# Patient Record
Sex: MALE | Race: OTHER | Employment: FULL TIME | URBAN - METROPOLITAN AREA
[De-identification: names, ages, dates, MRNs, and addresses within clinical notes are randomized per-mention and may not be internally consistent; named-entity substitution may affect disease eponyms.]

---

## 2017-01-23 ENCOUNTER — APPOINTMENT (OUTPATIENT)
Dept: LAB | Age: 57
End: 2017-01-23
Attending: UROLOGY
Payer: COMMERCIAL

## 2017-01-23 DIAGNOSIS — Z01.812 PRE-PROCEDURE LAB EXAM: ICD-10-CM

## 2017-01-23 PROCEDURE — 87086 URINE CULTURE/COLONY COUNT: CPT

## 2017-01-26 PROCEDURE — 88305 TISSUE EXAM BY PATHOLOGIST: CPT | Performed by: UROLOGY

## 2017-02-07 PROBLEM — C61 CANCER OF PROSTATE W/MED RECUR RISK (T2B-C OR GLEASON 7 OR PSA 10-20) (HCC): Status: ACTIVE | Noted: 2017-02-07

## 2017-02-13 PROBLEM — J44.9 CHRONIC OBSTRUCTIVE PULMONARY DISEASE (HCC): Status: ACTIVE | Noted: 2017-02-13

## 2017-02-13 NOTE — PROGRESS NOTES
Chief Complaint:   Patient presents with:  Pre-Op Exam    HPI:   This is a 60year old male     ANXIETY   Not taking medications  More anxious, because of ongoing illness  Not sleeping well  Denies worthlessness or hopelessness, has been using wifes klonop into the lungs 2 (two) times daily. Disp: 3 Package Rfl: 3     No current facility-administered medications on file prior to visit.    Ready to quit: Not Answered  Counseling given: Not Answered      REVIEW OF SYSTEMS:   Review of systems significant for an (CPT=93017); Future    Essential hypertension  Will start on lisinopril    -     lisinopril 10 MG Oral Tab; Take 1 tablet (10 mg total) by mouth daily. No Follow-up on file.

## 2017-02-13 NOTE — PATIENT INSTRUCTIONS
Thank you for choosing [facility]  To Do:  FOR JHOAN SHEREEN [address] [facility]  1. Retreive old records from cardiology  2. Con tinue Klonopin  3. Follow up in 1 month for pre op exam  4. Start Flovent for COPD  5.  Start Lisinopril for blood pressure, Daily BP saleem a therapist by yourself or in a group. Therapy can also help you work through problems in your life, such as drug or alcohol dependence, that may be making your anxiety worse.   Getting better takes time  Therapy will help you feel better and teach you skil more. Quitting isn’t easy, but millions of people have done it. You can, too. It’s never too late to quit.   Getting started  Boost your chances of success by deciding on your “quit plan.” Your health care provider and cardiac rehab team can help you develo stressful. · If you smoke, now’s a great time to quit. Even if you don’t quit, never smoke around your loved one. Secondhand smoke is dangerous to his or her heart. · The best goals are accomplished in teams.  Remember that when your loved one states he o reasons you want to quit. Keep this list and read it often. · Pick a date to quit smoking. Then stick to it. · List the things that make you want to smoke. Think of ways to avoid these triggers.   · Set goals for yourself, such as going for a week without

## 2017-02-15 PROBLEM — I10 ESSENTIAL HYPERTENSION: Status: ACTIVE | Noted: 2017-02-15

## 2017-02-15 PROBLEM — E55.9 VITAMIN D DEFICIENCY: Status: ACTIVE | Noted: 2017-02-15

## 2017-02-16 ENCOUNTER — TELEPHONE (OUTPATIENT)
Dept: FAMILY MEDICINE CLINIC | Facility: CLINIC | Age: 57
End: 2017-02-16

## 2017-05-22 RX ORDER — LISINOPRIL 10 MG/1
TABLET ORAL
Qty: 90 TABLET | Refills: 0 | Status: SHIPPED | OUTPATIENT
Start: 2017-05-22 | End: 2017-08-21

## 2017-08-22 RX ORDER — LISINOPRIL 10 MG/1
TABLET ORAL
Qty: 30 TABLET | Refills: 0 | Status: SHIPPED | OUTPATIENT
Start: 2017-08-22 | End: 2017-10-03

## 2017-10-03 RX ORDER — LISINOPRIL 10 MG/1
TABLET ORAL
Qty: 30 TABLET | Refills: 0 | Status: SHIPPED | OUTPATIENT
Start: 2017-10-03 | End: 2021-04-12

## 2017-10-03 NOTE — TELEPHONE ENCOUNTER
LOV : 02/13/17 LF:08/22/17    Pending Prescriptions Disp Refills    LISINOPRIL 10 MG Oral Tab [Pharmacy Med Name: LISINOPRIL 10MG  TAB] 30 tablet 0     Sig: TAKE ONE TABLET BY MOUTH ONCE DAILY       Appointment in past 6 or next 3 months    Please approve

## 2018-11-14 ENCOUNTER — TELEPHONE (OUTPATIENT)
Dept: FAMILY MEDICINE CLINIC | Facility: CLINIC | Age: 58
End: 2018-11-14

## 2018-11-14 NOTE — TELEPHONE ENCOUNTER
Left message for patient letting him know that he has not been seen since 2017 and is due for a annual physical so we can also check his blood pressure. Last time patient was in office bp was elevated.  If patient calls back he will need a visit with a prov

## 2019-02-13 ENCOUNTER — PATIENT OUTREACH (OUTPATIENT)
Dept: FAMILY MEDICINE CLINIC | Facility: CLINIC | Age: 59
End: 2019-02-13

## 2021-04-12 ENCOUNTER — APPOINTMENT (OUTPATIENT)
Dept: CT IMAGING | Facility: HOSPITAL | Age: 61
End: 2021-04-12
Attending: EMERGENCY MEDICINE
Payer: COMMERCIAL

## 2021-04-12 ENCOUNTER — APPOINTMENT (OUTPATIENT)
Dept: GENERAL RADIOLOGY | Facility: HOSPITAL | Age: 61
End: 2021-04-12
Payer: COMMERCIAL

## 2021-04-12 ENCOUNTER — HOSPITAL ENCOUNTER (OUTPATIENT)
Facility: HOSPITAL | Age: 61
Setting detail: OBSERVATION
Discharge: HOME | End: 2021-04-13
Attending: INTERNAL MEDICINE | Admitting: INTERNAL MEDICINE
Payer: COMMERCIAL

## 2021-04-12 DIAGNOSIS — R07.9 CHEST PAIN OF UNCERTAIN ETIOLOGY: Primary | ICD-10-CM

## 2021-04-12 DIAGNOSIS — R20.2 PARESTHESIAS: ICD-10-CM

## 2021-04-12 PROCEDURE — 70450 CT HEAD/BRAIN W/O DYE: CPT | Performed by: EMERGENCY MEDICINE

## 2021-04-12 PROCEDURE — 71045 X-RAY EXAM CHEST 1 VIEW: CPT

## 2021-04-12 PROCEDURE — 99220 INITIAL OBSERVATION CARE,LEVL III: CPT | Performed by: HOSPITALIST

## 2021-04-12 RX ORDER — POLYETHYLENE GLYCOL 3350 17 G/17G
17 POWDER, FOR SOLUTION ORAL DAILY PRN
Status: DISCONTINUED | OUTPATIENT
Start: 2021-04-12 | End: 2021-04-13

## 2021-04-12 RX ORDER — CLONAZEPAM 0.5 MG/1
0.5 TABLET ORAL 2 TIMES DAILY PRN
Status: DISCONTINUED | OUTPATIENT
Start: 2021-04-12 | End: 2021-04-13

## 2021-04-12 RX ORDER — SODIUM PHOSPHATE, DIBASIC AND SODIUM PHOSPHATE, MONOBASIC 7; 19 G/133ML; G/133ML
1 ENEMA RECTAL ONCE AS NEEDED
Status: DISCONTINUED | OUTPATIENT
Start: 2021-04-12 | End: 2021-04-13

## 2021-04-12 RX ORDER — BISACODYL 10 MG
10 SUPPOSITORY, RECTAL RECTAL
Status: DISCONTINUED | OUTPATIENT
Start: 2021-04-12 | End: 2021-04-13

## 2021-04-12 RX ORDER — ASPIRIN 325 MG
325 TABLET ORAL DAILY
Status: DISCONTINUED | OUTPATIENT
Start: 2021-04-13 | End: 2021-04-13

## 2021-04-12 RX ORDER — NITROGLYCERIN 0.4 MG/1
0.4 TABLET SUBLINGUAL EVERY 5 MIN PRN
Status: DISCONTINUED | OUTPATIENT
Start: 2021-04-12 | End: 2021-04-13

## 2021-04-12 RX ORDER — CLONAZEPAM 0.5 MG/1
0.5 TABLET ORAL 3 TIMES DAILY PRN
COMMUNITY

## 2021-04-12 RX ORDER — ACETAMINOPHEN 325 MG/1
650 TABLET ORAL EVERY 6 HOURS PRN
Status: DISCONTINUED | OUTPATIENT
Start: 2021-04-12 | End: 2021-04-13

## 2021-04-12 RX ORDER — ONDANSETRON 2 MG/ML
4 INJECTION INTRAMUSCULAR; INTRAVENOUS EVERY 6 HOURS PRN
Status: DISCONTINUED | OUTPATIENT
Start: 2021-04-12 | End: 2021-04-13

## 2021-04-12 RX ORDER — ENOXAPARIN SODIUM 100 MG/ML
40 INJECTION SUBCUTANEOUS NIGHTLY
Status: DISCONTINUED | OUTPATIENT
Start: 2021-04-12 | End: 2021-04-13

## 2021-04-12 RX ORDER — ASPIRIN 325 MG
325 TABLET ORAL DAILY
Status: DISCONTINUED | OUTPATIENT
Start: 2021-04-12 | End: 2021-04-12

## 2021-04-12 NOTE — ED INITIAL ASSESSMENT (HPI)
Patient states for the last 2 months he has been having this right sided chest discomfort. It has worsened and wakes him up at night and aches throughout the day. Patient states \"It almost feels like a pulled muscle but I know its not. \" Patient states he

## 2021-04-12 NOTE — ED PROVIDER NOTES
Patient Seen in: [facility] Emergency Department      History   Patient presents with:  Chest Pain Angina    Stated Complaint: chest pain    HPI/Subjective:   HPI    This is a 41-year-old gentleman, history of previous hypertension no longer under t ischemic attack) 10/2011              Past Surgical History:   Procedure Laterality Date   • OTHER SURGICAL HISTORY      cyst removed from back 3/2016   • OTHER SURGICAL HISTORY  1/27/17    prostate biopsy                Social History    Tobacco Use limits   CBC W/ DIFFERENTIAL - Abnormal; Notable for the following components:    WBC 12.5 (*)     RDW-SD 46.8 (*)     Neutrophil Absolute Prelim 9.26 (*)     Neutrophil Absolute 9.26 (*)     Monocyte Absolute 1.19 (*)     All other components within stephie EKG has no acute ischemic changes his troponin is negative his D-dimer is negative both of which are reassuring given the duration of his symptoms.   Patient reports no acute change in the paresthesias in his left hand and left foot, we will obtain a CT of

## 2021-04-12 NOTE — H&P
[facility]PITAL[facility]  History and Physical     [facility] Patient Status:  Emergency    1960 MRN [id number]   Location [address] Attending Gertrude Hutchison MD   Hosp Day # 0 PCP No primary care provider on file.      Chief Hypertension Father    • Lipids Father    • Cancer Father         prostate cancer   • Other (Other[other]) Mother         TB   • Hypertension Mother      Allergies: No Known Allergies  Medications:  No current facility-administered medications on file prio <0.045     Imaging: Imaging data reviewed in Epic. ASSESSMENT / PLAN:   1. Chest Pain- atypical  1. Trop neg x2   2. ECHO  3. ASA  4. [facility] Cardio consult  2. Transient left sided tingling- exam unimpressive at this time- CT brain in ED unremarkable  3.  Anx

## 2021-04-13 ENCOUNTER — APPOINTMENT (OUTPATIENT)
Dept: CV DIAGNOSTICS | Facility: HOSPITAL | Age: 61
End: 2021-04-13
Attending: HOSPITALIST
Payer: COMMERCIAL

## 2021-04-13 ENCOUNTER — APPOINTMENT (OUTPATIENT)
Dept: CV DIAGNOSTICS | Facility: HOSPITAL | Age: 61
End: 2021-04-13
Attending: INTERNAL MEDICINE
Payer: COMMERCIAL

## 2021-04-13 VITALS
RESPIRATION RATE: 18 BRPM | TEMPERATURE: 98 F | HEART RATE: 67 BPM | HEIGHT: 72 IN | DIASTOLIC BLOOD PRESSURE: 84 MMHG | BODY MASS INDEX: 24.92 KG/M2 | SYSTOLIC BLOOD PRESSURE: 133 MMHG | OXYGEN SATURATION: 97 % | WEIGHT: 184 LBS

## 2021-04-13 PROCEDURE — 93306 TTE W/DOPPLER COMPLETE: CPT | Performed by: HOSPITALIST

## 2021-04-13 PROCEDURE — 99217 OBSERVATION CARE DISCHARGE: CPT | Performed by: INTERNAL MEDICINE

## 2021-04-13 PROCEDURE — 78452 HT MUSCLE IMAGE SPECT MULT: CPT | Performed by: INTERNAL MEDICINE

## 2021-04-13 PROCEDURE — 93017 CV STRESS TEST TRACING ONLY: CPT | Performed by: INTERNAL MEDICINE

## 2021-04-13 PROCEDURE — 93018 CV STRESS TEST I&R ONLY: CPT | Performed by: INTERNAL MEDICINE

## 2021-04-13 RX ORDER — PANTOPRAZOLE SODIUM 40 MG/1
40 TABLET, DELAYED RELEASE ORAL
Status: DISCONTINUED | OUTPATIENT
Start: 2021-04-13 | End: 2021-04-13

## 2021-04-13 RX ORDER — PANTOPRAZOLE SODIUM 40 MG/1
40 TABLET, DELAYED RELEASE ORAL
Qty: 14 TABLET | Refills: 0 | Status: SHIPPED | OUTPATIENT
Start: 2021-04-14 | End: 2021-04-28

## 2021-04-13 NOTE — PLAN OF CARE
NURSING ADMISSION NOTE      Patient admitted via Cart  Oriented to room. Safety precautions initiated. Bed in low position. Call light in reach.        04/12/21 1930 04/12/21 1932 04/12/21 1934   Vital Signs   /80 124/81 145/77   MAP (mmHg) 89

## 2021-04-13 NOTE — PROGRESS NOTES
04/13/21 1102   Clinical Encounter Type   Visited With Patient   Patient's Supportive Strategies/Resources Confucianist/Elisha Haas Oriental orthodox in [place]. Referral To Nurse  ( provided Power of  for [employer].  Patient would like some hugo

## 2021-04-13 NOTE — PLAN OF CARE
Received pt at 0730. A&o x4. NSR on tele. VSS. Denies SOB. WNL on room air. Up ad graeme. Last BM 4/12 with good urine output. Pt denies chest pain.      POC: Echo, lexiscan    Problem: Patient/Family Goals  Goal: Patient/Family Long Term Goal  Description: Pa

## 2021-04-13 NOTE — PROGRESS NOTES
CARDIODIAGNOSTIC PRELIMINARY REPORT:     Raissa Emelyn completed     Tolerated well    Second set of images pending

## 2021-04-13 NOTE — PROGRESS NOTES
NURSING DISCHARGE NOTE    Discharged Home via Wheelchair. Accompanied by Spouse  Belongings Taken by patient/family. Tele dc'd. IV removed. Catheter intact. Discharge instructions completed. Pt verbalized understanding.

## 2021-04-13 NOTE — PLAN OF CARE
Preliminary nuclear stress test results as called to me by Dr. Dre Wood:    LVEF 53% and negative for perfusion abnormalities. Stable CV status for DC. Recommend PPI at DC.      Juanchaim Hernandez, RACHEL   [facility] Cardiology

## 2021-04-13 NOTE — DISCHARGE SUMMARY
[facility]  Discharge Summary    Manju Benjamin Patient Status:  Observation    1960 MRN [id number]   [facility] 2NE-A Attending Phill Mcleod MD   Hosp Day # 0 PCP No primary care provider on file.      Date of Admission:  size was normal. Wall thickness was normal.      Systolic function was normal. The estimated ejection fraction was 60-65%.      There was no diagnostic evidence for regional wall motion abnormalities.      Left ventricular diastolic function parameters wer Medications:        Discharge Medications      START taking these medications      Instructions Prescription details   Pantoprazole Sodium 40 MG Tbec  Commonly known as: PROTONIX  Start taking on: April 14, 2021      Take 1 tablet (40 mg total) by mouth ev mucosa moist  Neck: no adenopathy, no carotid bruit, no JVD  Lungs: clear to auscultation bilaterally  Heart: S1, S2 normal, no murmur,  regular rate and rhythm  Abdomen: soft, non-tender; bowel sounds normal  Extremities: extremities normal,no cyanosis or

## 2021-04-13 NOTE — CONSULTS
[facility] Cardiology Consultation    Felecia Ashleyri Patient Status:  Observation    1960 MRN [id number]   [facility] 2NE-A Attending Larry Ferrara MD   Hosp Day # 0 PCP No primary care provider on file.      Reason for Consultation:  Chest pa reports that he does not use drugs. Review of Systems:  All systems were reviewed and are negative except as described above in HPI.     Physical Exam:      Temp:  [97.6 °F (36.4 °C)-98 °F (36.7 °C)] 97.6 °F (36.4 °C)  Pulse:  [59-74] 67  Resp:  [13-20]

## 2024-01-22 RX ORDER — BUDESONIDE AND FORMOTEROL FUMARATE DIHYDRATE 160; 4.5 UG/1; UG/1
2 AEROSOL RESPIRATORY (INHALATION) 2 TIMES DAILY
COMMUNITY

## 2024-01-22 RX ORDER — PRAZOSIN HYDROCHLORIDE 1 MG/1
1 CAPSULE ORAL DAILY
COMMUNITY

## 2024-01-22 RX ORDER — TIOTROPIUM BROMIDE INHALATION SPRAY 3.12 UG/1
1 SPRAY, METERED RESPIRATORY (INHALATION) AS NEEDED
COMMUNITY

## 2024-01-22 RX ORDER — AMLODIPINE BESYLATE 5 MG/1
5 TABLET ORAL DAILY
COMMUNITY

## 2024-01-22 RX ORDER — ACETAMINOPHEN 325 MG/1
650 TABLET ORAL EVERY 6 HOURS PRN
COMMUNITY

## 2024-01-23 ENCOUNTER — LAB ENCOUNTER (OUTPATIENT)
Dept: LAB | Age: 64
End: 2024-01-23
Attending: STUDENT IN AN ORGANIZED HEALTH CARE EDUCATION/TRAINING PROGRAM
Payer: COMMERCIAL

## 2024-01-23 DIAGNOSIS — Z01.818 PREOP TESTING: ICD-10-CM

## 2024-01-23 PROCEDURE — 87641 MR-STAPH DNA AMP PROBE: CPT

## 2024-01-24 LAB — MRSA DNA SPEC QL NAA+PROBE: NEGATIVE

## 2024-01-25 ENCOUNTER — ANESTHESIA EVENT (OUTPATIENT)
Dept: SURGERY | Facility: HOSPITAL | Age: 64
End: 2024-01-25
Payer: COMMERCIAL

## 2024-01-26 ENCOUNTER — HOSPITAL ENCOUNTER (OUTPATIENT)
Facility: HOSPITAL | Age: 64
Discharge: HOME | End: 2024-01-27
Attending: STUDENT IN AN ORGANIZED HEALTH CARE EDUCATION/TRAINING PROGRAM | Admitting: STUDENT IN AN ORGANIZED HEALTH CARE EDUCATION/TRAINING PROGRAM
Payer: COMMERCIAL

## 2024-01-26 ENCOUNTER — ANESTHESIA (OUTPATIENT)
Dept: SURGERY | Facility: HOSPITAL | Age: 64
End: 2024-01-26
Payer: COMMERCIAL

## 2024-01-26 ENCOUNTER — APPOINTMENT (OUTPATIENT)
Dept: GENERAL RADIOLOGY | Facility: HOSPITAL | Age: 64
End: 2024-01-26
Attending: STUDENT IN AN ORGANIZED HEALTH CARE EDUCATION/TRAINING PROGRAM
Payer: COMMERCIAL

## 2024-01-26 DIAGNOSIS — Z01.818 PREOP TESTING: Primary | ICD-10-CM

## 2024-01-26 PROBLEM — M48.061 LUMBAR SPINAL STENOSIS: Status: ACTIVE | Noted: 2024-01-26

## 2024-01-26 PROBLEM — J44.9 COPD (CHRONIC OBSTRUCTIVE PULMONARY DISEASE) (HCC): Status: ACTIVE | Noted: 2017-02-13

## 2024-01-26 PROCEDURE — 76000 FLUOROSCOPY <1 HR PHYS/QHP: CPT | Performed by: STUDENT IN AN ORGANIZED HEALTH CARE EDUCATION/TRAINING PROGRAM

## 2024-01-26 PROCEDURE — 01NB0ZZ RELEASE LUMBAR NERVE, OPEN APPROACH: ICD-10-PCS | Performed by: STUDENT IN AN ORGANIZED HEALTH CARE EDUCATION/TRAINING PROGRAM

## 2024-01-26 PROCEDURE — 99214 OFFICE O/P EST MOD 30 MIN: CPT | Performed by: HOSPITALIST

## 2024-01-26 PROCEDURE — 0SG3071 FUSION OF LUMBOSACRAL JOINT WITH AUTOLOGOUS TISSUE SUBSTITUTE, POSTERIOR APPROACH, POSTERIOR COLUMN, OPEN APPROACH: ICD-10-PCS | Performed by: STUDENT IN AN ORGANIZED HEALTH CARE EDUCATION/TRAINING PROGRAM

## 2024-01-26 PROCEDURE — 0SG0071 FUSION OF LUMBAR VERTEBRAL JOINT WITH AUTOLOGOUS TISSUE SUBSTITUTE, POSTERIOR APPROACH, POSTERIOR COLUMN, OPEN APPROACH: ICD-10-PCS | Performed by: STUDENT IN AN ORGANIZED HEALTH CARE EDUCATION/TRAINING PROGRAM

## 2024-01-26 RX ORDER — ONDANSETRON 2 MG/ML
INJECTION INTRAMUSCULAR; INTRAVENOUS AS NEEDED
Status: DISCONTINUED | OUTPATIENT
Start: 2024-01-26 | End: 2024-01-26 | Stop reason: SURG

## 2024-01-26 RX ORDER — MORPHINE SULFATE 10 MG/ML
6 INJECTION, SOLUTION INTRAMUSCULAR; INTRAVENOUS EVERY 10 MIN PRN
Status: DISCONTINUED | OUTPATIENT
Start: 2024-01-26 | End: 2024-01-26 | Stop reason: HOSPADM

## 2024-01-26 RX ORDER — DIPHENHYDRAMINE HYDROCHLORIDE 50 MG/ML
25 INJECTION INTRAMUSCULAR; INTRAVENOUS EVERY 4 HOURS PRN
Status: DISCONTINUED | OUTPATIENT
Start: 2024-01-26 | End: 2024-01-27

## 2024-01-26 RX ORDER — BUPIVACAINE HYDROCHLORIDE AND EPINEPHRINE 5; 5 MG/ML; UG/ML
INJECTION, SOLUTION PERINEURAL AS NEEDED
Status: DISCONTINUED | OUTPATIENT
Start: 2024-01-26 | End: 2024-01-26 | Stop reason: HOSPADM

## 2024-01-26 RX ORDER — CEFAZOLIN SODIUM/WATER 2 G/20 ML
2 SYRINGE (ML) INTRAVENOUS ONCE
Status: COMPLETED | OUTPATIENT
Start: 2024-01-26 | End: 2024-01-26

## 2024-01-26 RX ORDER — SODIUM CHLORIDE 9 MG/ML
INJECTION, SOLUTION INTRAVENOUS CONTINUOUS PRN
Status: DISCONTINUED | OUTPATIENT
Start: 2024-01-26 | End: 2024-01-26 | Stop reason: SURG

## 2024-01-26 RX ORDER — VANCOMYCIN HYDROCHLORIDE 1 G/20ML
INJECTION, POWDER, LYOPHILIZED, FOR SOLUTION INTRAVENOUS AS NEEDED
Status: DISCONTINUED | OUTPATIENT
Start: 2024-01-26 | End: 2024-01-26 | Stop reason: HOSPADM

## 2024-01-26 RX ORDER — AMLODIPINE BESYLATE 5 MG/1
5 TABLET ORAL DAILY
Status: DISCONTINUED | OUTPATIENT
Start: 2024-01-27 | End: 2024-01-27

## 2024-01-26 RX ORDER — HYDROMORPHONE HYDROCHLORIDE 1 MG/ML
0.6 INJECTION, SOLUTION INTRAMUSCULAR; INTRAVENOUS; SUBCUTANEOUS EVERY 5 MIN PRN
Status: DISCONTINUED | OUTPATIENT
Start: 2024-01-26 | End: 2024-01-26 | Stop reason: HOSPADM

## 2024-01-26 RX ORDER — ENEMA 19; 7 G/133ML; G/133ML
1 ENEMA RECTAL ONCE AS NEEDED
Status: DISCONTINUED | OUTPATIENT
Start: 2024-01-26 | End: 2024-01-27

## 2024-01-26 RX ORDER — HYDROCODONE BITARTRATE AND ACETAMINOPHEN 10; 325 MG/1; MG/1
1 TABLET ORAL EVERY 6 HOURS PRN
Status: DISCONTINUED | OUTPATIENT
Start: 2024-01-26 | End: 2024-01-27

## 2024-01-26 RX ORDER — HYDROMORPHONE HYDROCHLORIDE 1 MG/ML
0.4 INJECTION, SOLUTION INTRAMUSCULAR; INTRAVENOUS; SUBCUTANEOUS EVERY 5 MIN PRN
Status: DISCONTINUED | OUTPATIENT
Start: 2024-01-26 | End: 2024-01-26 | Stop reason: HOSPADM

## 2024-01-26 RX ORDER — TRANEXAMIC ACID 10 MG/ML
INJECTION, SOLUTION INTRAVENOUS AS NEEDED
Status: DISCONTINUED | OUTPATIENT
Start: 2024-01-26 | End: 2024-01-26 | Stop reason: SURG

## 2024-01-26 RX ORDER — KETAMINE HYDROCHLORIDE 50 MG/ML
INJECTION, SOLUTION INTRAMUSCULAR; INTRAVENOUS AS NEEDED
Status: DISCONTINUED | OUTPATIENT
Start: 2024-01-26 | End: 2024-01-26 | Stop reason: SURG

## 2024-01-26 RX ORDER — METOCLOPRAMIDE HYDROCHLORIDE 5 MG/ML
10 INJECTION INTRAMUSCULAR; INTRAVENOUS EVERY 8 HOURS PRN
Status: DISCONTINUED | OUTPATIENT
Start: 2024-01-26 | End: 2024-01-27

## 2024-01-26 RX ORDER — ACETAMINOPHEN 500 MG
1000 TABLET ORAL ONCE
Status: COMPLETED | OUTPATIENT
Start: 2024-01-26 | End: 2024-01-26

## 2024-01-26 RX ORDER — ALBUTEROL SULFATE 2.5 MG/3ML
2.5 SOLUTION RESPIRATORY (INHALATION) AS NEEDED
Status: DISCONTINUED | OUTPATIENT
Start: 2024-01-26 | End: 2024-01-26 | Stop reason: HOSPADM

## 2024-01-26 RX ORDER — PRAZOSIN HYDROCHLORIDE 1 MG/1
1 CAPSULE ORAL DAILY
Status: DISCONTINUED | OUTPATIENT
Start: 2024-01-27 | End: 2024-01-27

## 2024-01-26 RX ORDER — POLYETHYLENE GLYCOL 3350 17 G/17G
17 POWDER, FOR SOLUTION ORAL DAILY PRN
Status: DISCONTINUED | OUTPATIENT
Start: 2024-01-26 | End: 2024-01-27

## 2024-01-26 RX ORDER — MIDAZOLAM HYDROCHLORIDE 1 MG/ML
INJECTION INTRAMUSCULAR; INTRAVENOUS AS NEEDED
Status: DISCONTINUED | OUTPATIENT
Start: 2024-01-26 | End: 2024-01-26 | Stop reason: SURG

## 2024-01-26 RX ORDER — SENNOSIDES 8.6 MG
17.2 TABLET ORAL NIGHTLY
Status: DISCONTINUED | OUTPATIENT
Start: 2024-01-26 | End: 2024-01-27

## 2024-01-26 RX ORDER — LIDOCAINE HYDROCHLORIDE 10 MG/ML
INJECTION, SOLUTION EPIDURAL; INFILTRATION; INTRACAUDAL; PERINEURAL AS NEEDED
Status: DISCONTINUED | OUTPATIENT
Start: 2024-01-26 | End: 2024-01-26 | Stop reason: SURG

## 2024-01-26 RX ORDER — FLUTICASONE FUROATE AND VILANTEROL 200; 25 UG/1; UG/1
1 POWDER RESPIRATORY (INHALATION) DAILY
Status: DISCONTINUED | OUTPATIENT
Start: 2024-01-26 | End: 2024-01-27

## 2024-01-26 RX ORDER — DEXAMETHASONE SODIUM PHOSPHATE 4 MG/ML
VIAL (ML) INJECTION AS NEEDED
Status: DISCONTINUED | OUTPATIENT
Start: 2024-01-26 | End: 2024-01-26 | Stop reason: SURG

## 2024-01-26 RX ORDER — PHENYLEPHRINE HCL 10 MG/ML
VIAL (ML) INJECTION AS NEEDED
Status: DISCONTINUED | OUTPATIENT
Start: 2024-01-26 | End: 2024-01-26 | Stop reason: SURG

## 2024-01-26 RX ORDER — CEFAZOLIN SODIUM/WATER 2 G/20 ML
2 SYRINGE (ML) INTRAVENOUS EVERY 8 HOURS
Qty: 40 ML | Refills: 0 | Status: COMPLETED | OUTPATIENT
Start: 2024-01-26 | End: 2024-01-27

## 2024-01-26 RX ORDER — MORPHINE SULFATE 4 MG/ML
4 INJECTION, SOLUTION INTRAMUSCULAR; INTRAVENOUS EVERY 10 MIN PRN
Status: DISCONTINUED | OUTPATIENT
Start: 2024-01-26 | End: 2024-01-26 | Stop reason: HOSPADM

## 2024-01-26 RX ORDER — METOCLOPRAMIDE HYDROCHLORIDE 5 MG/ML
10 INJECTION INTRAMUSCULAR; INTRAVENOUS EVERY 8 HOURS PRN
Status: DISCONTINUED | OUTPATIENT
Start: 2024-01-26 | End: 2024-01-26 | Stop reason: HOSPADM

## 2024-01-26 RX ORDER — SODIUM CHLORIDE, SODIUM LACTATE, POTASSIUM CHLORIDE, CALCIUM CHLORIDE 600; 310; 30; 20 MG/100ML; MG/100ML; MG/100ML; MG/100ML
INJECTION, SOLUTION INTRAVENOUS CONTINUOUS
Status: DISCONTINUED | OUTPATIENT
Start: 2024-01-26 | End: 2024-01-27

## 2024-01-26 RX ORDER — HYDROCODONE BITARTRATE AND ACETAMINOPHEN 5; 325 MG/1; MG/1
1 TABLET ORAL EVERY 6 HOURS PRN
Status: DISCONTINUED | OUTPATIENT
Start: 2024-01-26 | End: 2024-01-27

## 2024-01-26 RX ORDER — BISACODYL 10 MG
10 SUPPOSITORY, RECTAL RECTAL
Status: DISCONTINUED | OUTPATIENT
Start: 2024-01-26 | End: 2024-01-27

## 2024-01-26 RX ORDER — MORPHINE SULFATE 4 MG/ML
2 INJECTION, SOLUTION INTRAMUSCULAR; INTRAVENOUS EVERY 10 MIN PRN
Status: DISCONTINUED | OUTPATIENT
Start: 2024-01-26 | End: 2024-01-26 | Stop reason: HOSPADM

## 2024-01-26 RX ORDER — DIAZEPAM 2 MG/1
2 TABLET ORAL EVERY 6 HOURS PRN
Status: DISCONTINUED | OUTPATIENT
Start: 2024-01-26 | End: 2024-01-27

## 2024-01-26 RX ORDER — IPRATROPIUM BROMIDE AND ALBUTEROL SULFATE 2.5; .5 MG/3ML; MG/3ML
3 SOLUTION RESPIRATORY (INHALATION) EVERY 6 HOURS PRN
Status: DISCONTINUED | OUTPATIENT
Start: 2024-01-26 | End: 2024-01-27

## 2024-01-26 RX ORDER — CYCLOBENZAPRINE HCL 10 MG
10 TABLET ORAL EVERY 6 HOURS PRN
Status: DISCONTINUED | OUTPATIENT
Start: 2024-01-26 | End: 2024-01-27

## 2024-01-26 RX ORDER — DOCUSATE SODIUM 100 MG/1
100 CAPSULE, LIQUID FILLED ORAL 2 TIMES DAILY
Status: DISCONTINUED | OUTPATIENT
Start: 2024-01-26 | End: 2024-01-27

## 2024-01-26 RX ORDER — ROCURONIUM BROMIDE 10 MG/ML
INJECTION, SOLUTION INTRAVENOUS AS NEEDED
Status: DISCONTINUED | OUTPATIENT
Start: 2024-01-26 | End: 2024-01-26 | Stop reason: SURG

## 2024-01-26 RX ORDER — SODIUM CHLORIDE, SODIUM LACTATE, POTASSIUM CHLORIDE, CALCIUM CHLORIDE 600; 310; 30; 20 MG/100ML; MG/100ML; MG/100ML; MG/100ML
INJECTION, SOLUTION INTRAVENOUS CONTINUOUS
Status: DISCONTINUED | OUTPATIENT
Start: 2024-01-26 | End: 2024-01-26 | Stop reason: HOSPADM

## 2024-01-26 RX ORDER — LABETALOL HYDROCHLORIDE 5 MG/ML
5 INJECTION, SOLUTION INTRAVENOUS EVERY 5 MIN PRN
Status: DISCONTINUED | OUTPATIENT
Start: 2024-01-26 | End: 2024-01-26 | Stop reason: HOSPADM

## 2024-01-26 RX ORDER — DIPHENHYDRAMINE HCL 25 MG
25 CAPSULE ORAL EVERY 4 HOURS PRN
Status: DISCONTINUED | OUTPATIENT
Start: 2024-01-26 | End: 2024-01-27

## 2024-01-26 RX ORDER — ONDANSETRON 2 MG/ML
4 INJECTION INTRAMUSCULAR; INTRAVENOUS EVERY 6 HOURS PRN
Status: DISCONTINUED | OUTPATIENT
Start: 2024-01-26 | End: 2024-01-27

## 2024-01-26 RX ORDER — LOSARTAN POTASSIUM 25 MG/1
25 TABLET ORAL DAILY
COMMUNITY

## 2024-01-26 RX ORDER — ONDANSETRON 2 MG/ML
4 INJECTION INTRAMUSCULAR; INTRAVENOUS EVERY 6 HOURS PRN
Status: DISCONTINUED | OUTPATIENT
Start: 2024-01-26 | End: 2024-01-26 | Stop reason: HOSPADM

## 2024-01-26 RX ORDER — NALOXONE HYDROCHLORIDE 0.4 MG/ML
0.08 INJECTION, SOLUTION INTRAMUSCULAR; INTRAVENOUS; SUBCUTANEOUS AS NEEDED
Status: DISCONTINUED | OUTPATIENT
Start: 2024-01-26 | End: 2024-01-26 | Stop reason: HOSPADM

## 2024-01-26 RX ORDER — HYDROMORPHONE HYDROCHLORIDE 1 MG/ML
0.2 INJECTION, SOLUTION INTRAMUSCULAR; INTRAVENOUS; SUBCUTANEOUS EVERY 5 MIN PRN
Status: DISCONTINUED | OUTPATIENT
Start: 2024-01-26 | End: 2024-01-26 | Stop reason: HOSPADM

## 2024-01-26 RX ADMIN — ROCURONIUM BROMIDE 20 MG: 10 INJECTION, SOLUTION INTRAVENOUS at 09:27:00

## 2024-01-26 RX ADMIN — PHENYLEPHRINE HCL 150 MCG: 10 MG/ML VIAL (ML) INJECTION at 09:18:00

## 2024-01-26 RX ADMIN — ROCURONIUM BROMIDE 20 MG: 10 INJECTION, SOLUTION INTRAVENOUS at 08:53:00

## 2024-01-26 RX ADMIN — ROCURONIUM BROMIDE 50 MG: 10 INJECTION, SOLUTION INTRAVENOUS at 07:37:00

## 2024-01-26 RX ADMIN — PHENYLEPHRINE HCL 100 MCG: 10 MG/ML VIAL (ML) INJECTION at 08:46:00

## 2024-01-26 RX ADMIN — ROCURONIUM BROMIDE 20 MG: 10 INJECTION, SOLUTION INTRAVENOUS at 08:24:00

## 2024-01-26 RX ADMIN — DEXAMETHASONE SODIUM PHOSPHATE 8 MG: 4 MG/ML VIAL (ML) INJECTION at 07:39:00

## 2024-01-26 RX ADMIN — LIDOCAINE HYDROCHLORIDE 50 MG: 10 INJECTION, SOLUTION EPIDURAL; INFILTRATION; INTRACAUDAL; PERINEURAL at 07:37:00

## 2024-01-26 RX ADMIN — KETAMINE HYDROCHLORIDE 20 MG: 50 INJECTION, SOLUTION INTRAMUSCULAR; INTRAVENOUS at 10:43:00

## 2024-01-26 RX ADMIN — PHENYLEPHRINE HCL 100 MCG: 10 MG/ML VIAL (ML) INJECTION at 08:33:00

## 2024-01-26 RX ADMIN — MIDAZOLAM HYDROCHLORIDE 2 MG: 1 INJECTION INTRAMUSCULAR; INTRAVENOUS at 07:32:00

## 2024-01-26 RX ADMIN — SODIUM CHLORIDE, SODIUM LACTATE, POTASSIUM CHLORIDE, CALCIUM CHLORIDE: 600; 310; 30; 20 INJECTION, SOLUTION INTRAVENOUS at 07:32:00

## 2024-01-26 RX ADMIN — SODIUM CHLORIDE, SODIUM LACTATE, POTASSIUM CHLORIDE, CALCIUM CHLORIDE: 600; 310; 30; 20 INJECTION, SOLUTION INTRAVENOUS at 11:10:00

## 2024-01-26 RX ADMIN — CEFAZOLIN SODIUM/WATER 2 G: 2 G/20 ML SYRINGE (ML) INTRAVENOUS at 07:41:00

## 2024-01-26 RX ADMIN — ROCURONIUM BROMIDE 10 MG: 10 INJECTION, SOLUTION INTRAVENOUS at 08:05:00

## 2024-01-26 RX ADMIN — LIDOCAINE HYDROCHLORIDE 50 MG: 10 INJECTION, SOLUTION EPIDURAL; INFILTRATION; INTRACAUDAL; PERINEURAL at 10:50:00

## 2024-01-26 RX ADMIN — ROCURONIUM BROMIDE 10 MG: 10 INJECTION, SOLUTION INTRAVENOUS at 10:15:00

## 2024-01-26 RX ADMIN — SODIUM CHLORIDE: 9 INJECTION, SOLUTION INTRAVENOUS at 07:42:00

## 2024-01-26 RX ADMIN — SODIUM CHLORIDE: 9 INJECTION, SOLUTION INTRAVENOUS at 10:40:00

## 2024-01-26 RX ADMIN — ONDANSETRON 4 MG: 2 INJECTION INTRAMUSCULAR; INTRAVENOUS at 10:57:00

## 2024-01-26 RX ADMIN — SODIUM CHLORIDE, SODIUM LACTATE, POTASSIUM CHLORIDE, CALCIUM CHLORIDE: 600; 310; 30; 20 INJECTION, SOLUTION INTRAVENOUS at 10:40:00

## 2024-01-26 RX ADMIN — TRANEXAMIC ACID 1000 MG: 10 INJECTION, SOLUTION INTRAVENOUS at 07:57:00

## 2024-01-26 NOTE — ANESTHESIA PREPROCEDURE EVALUATION
Anesthesia PreOp Note    HPI:     Arvin Torres is a 63 year old male who presents for preoperative consultation requested by: Catracho Murry MD    Date of Surgery: 1/26/2024    Procedure(s):  Lumbar 3-S1 laminectomy  Indication: Lumbar decompression    Relevant Problems   No relevant active problems       NPO:  Last Liquid Consumption Date: 01/25/24  Last Liquid Consumption Time: 1800  Last Solid Consumption Date: 01/25/24  Last Solid Consumption Time: 1800  Last Liquid Consumption Date: 01/25/24          History Review:  Patient Active Problem List    Diagnosis Date Noted    Chest pain of uncertain etiology 04/12/2021    Paresthesias 04/12/2021    Cigarette nicotine dependence with nicotine-induced disorder     Essential hypertension 02/15/2017    Vitamin D deficiency 02/15/2017    Chronic obstructive pulmonary disease (HCC) 02/13/2017    Cancer of prostate w/med recur risk (T2b-c or [place] 7 or PSA 10-20) (HCC) 02/07/2017    Smoker 10/04/2016    Anxiety 10/04/2016    Chronic cough 10/04/2016    History of TIAs 10/04/2016       Past Medical History:   Diagnosis Date    Anxiety 10/04/2016    Back problem     Chronic cough 10/04/2016    COPD (chronic obstructive pulmonary disease) (HCC)     Elevated prostate specific antigen (PSA)     High blood pressure     History of TIAs 10/04/2016    approx 2011 developed aphasia. Admitted for 3 days at outside hospital. Work up negative per patient. Symptoms resolved within 2 hours.    Hx of motion sickness     Osteoarthritis     Pneumonia due to organism     Prostate cancer (HCC)     Sleep apnea     cpap    Smoker 10/04/2016    TIA (transient ischemic attack) 10/01/2011    Visual impairment     glasses       Past Surgical History:   Procedure Laterality Date    OTHER SURGICAL HISTORY      cyst removed from back 3/2016    OTHER SURGICAL HISTORY  1/27/17    prostate biopsy       Medications Prior to Admission   Medication Sig Dispense Refill Last Dose    Budesonide-Formoterol  Fumarate 160-4.5 MCG/ACT Inhalation Aerosol Inhale 2 puffs into the lungs 2 (two) times daily.   2024 at 0500    Tiotropium Bromide Monohydrate (SPIRIVA RESPIMAT) 2.5 MCG/ACT Inhalation Aero Soln Inhale 1 puff into the lungs as needed.   2024 at 0500    prazosin 1 MG Oral Cap Take 1 capsule (1 mg total) by mouth daily.   2024 at 0500    amLODIPine 5 MG Oral Tab Take 1 tablet (5 mg total) by mouth daily.   2024 at 0500    acetaminophen 325 MG Oral Tab Take 2 tablets (650 mg total) by mouth every 6 (six) hours as needed for Pain.   2024 at 1500     Current Facility-Administered Medications Ordered in Epic   Medication Dose Route Frequency Provider Last Rate Last Admin    lactated ringers infusion   Intravenous Continuous Catracho Murry MD        ceFAZolin (Ancef) 2 g in 20mL IV syringe premix  2 g Intravenous Once Catracho Murry MD         No current [facility]-ordered outpatient medications on file.       Allergies   Allergen Reactions    Corn PAIN     Bad leg cramps       Family History   Problem Relation Age of Onset    Hypertension Father     Lipids Father     Cancer Father         prostate cancer    Other (Other[other]) Mother         TB    Hypertension Mother      Social History     Socioeconomic History    Marital status:    Tobacco Use    Smoking status: Former     Packs/day: 0.50     Years: 45.00     Additional pack years: 0.00     Total pack years: 22.50     Types: Cigarettes     Quit date: 3/17/2023     Years since quittin.8    Smokeless tobacco: Never   Vaping Use    Vaping Use: Never used   Substance and Sexual Activity    Alcohol use: Yes     Comment: weekly    Drug use: No       Available pre-op labs reviewed.             Vital Signs:  Body mass index is 27.89 kg/m².   height is 1.803 m (5' 11\") and weight is 90.7 kg (200 lb). His oral temperature is 97.7 °F (36.5 °C). His blood pressure is 135/77 and his pulse is 83. His respiration is 16 and oxygen saturation is 94%.    Vitals:    01/22/24 1024 01/26/24 0625   BP:  135/77   Pulse:  83   Resp:  16   Temp:  97.7 °F (36.5 °C)   TempSrc:  Oral   SpO2:  94%   Weight: 88.5 kg (195 lb) 90.7 kg (200 lb)   Height: 1.803 m (5' 11\") 1.803 m (5' 11\")        Anesthesia Evaluation     Patient summary reviewed and Nursing notes reviewed    No history of anesthetic complications   Airway   Mallampati: II  TM distance: >3 FB  Neck ROM: full  Dental - Dentition appears grossly intact     Pulmonary - normal exam   (+) COPD moderate, sleep apnea on CPAP  Cardiovascular - normal exam  Exercise tolerance: good  (+) hypertension well controlled    Neuro/Psych    (+)  TIA, anxiety/panic attacks,        GI/Hepatic/Renal - negative ROS     Comments: Prostate cancer    Endo/Other - negative ROS   Abdominal  - normal exam                 Anesthesia Plan:   ASA:  3  Plan:   General  Airway:  ETT  Post-op Pain Management: IV analgesics and Oral pain medication  Informed Consent Plan and Risks Discussed With:  Patient  Discussed plan with:  CRNA      I have informed Arvin Torres of the nature of the anesthetic plan, benefits, risks including possible dental damage if relevant, major complications, and any alternative forms of anesthetic management.   All of the patient's questions were answered to the best of my ability. The patient desires the anesthetic management as planned.  PITER BENITEZ MD  1/26/2024 6:46 AM  Present on Admission:  **None**

## 2024-01-26 NOTE — INTERVAL H&P NOTE
Pre-op Diagnosis: Lumbar decompression    The above referenced H&P was reviewed by Catracho Murry MD on 1/26/2024, the patient was examined and no significant changes have occurred in the patient's condition since the H&P was performed.  I discussed with the patient and/or legal representative the potential benefits, risks and side effects of this procedure; the likelihood of the patient achieving goals; and potential problems that might occur during recuperation.  I discussed reasonable alternatives to the procedure, including risks, benefits and side effects related to the alternatives and risks related to not receiving this procedure.  We will proceed with procedure as planned.

## 2024-01-26 NOTE — ANESTHESIA POSTPROCEDURE EVALUATION
Patient: Arvin Torres    Procedure Summary       Date: 01/26/24 Room / Location: [facility] MAIN OR 02 / [facility] MAIN OR    Anesthesia Start: 0732 Anesthesia Stop:     Procedure: Lumbar 3-S1 laminectomy, lumbar 4-sacral 1 noninstrumented fusion (Spine Lumbar) Diagnosis: (Lumbar decompression)    Surgeons: Catracho Murry MD Anesthesiologist: Thee Buenrostro MD    Anesthesia Type: general ASA Status: 3            Anesthesia Type: general    Vitals Value Taken Time   /81 01/26/24 1114   Temp 98.6 °F (37 °C) 01/26/24 1114   Pulse 85 01/26/24 1114   Resp 16 01/26/24 1114   SpO2 99 % 01/26/24 1114   Vitals shown include unfiled device data.    [facility] AN Post Evaluation:   Patient Evaluated in PACU  Patient Participation: complete - patient participated  Level of Consciousness: awake  Pain Score: 0  Pain Management: adequate  Airway Patency:patent  Dental exam unchanged from preop  Yes    Cardiovascular Status: stable  Respiratory Status: nasal cannula  Postoperative Hydration stable      Marce Viera CRNA  1/26/2024 11:15 AM

## 2024-01-26 NOTE — OPERATIVE REPORT
Department of Orthopedic Surgery  Operative Report      DATE  01/26/24    SURGEON  Catracho Murry MD    ASSISTANT  Vitaly Rodríguez SA    PREOPERATIVE DIAGNOSIS   1. L3-S1 spinal stenosis  2. L4-5 degenerative spondylolisthesis  3. L5-S1 DDD with facet arthropathy  4. Bilateral L5 radiculopathy and neurogenic claudication      POSTOPERATIVE DIAGNOSIS   Same.     PROCEDURE   1. L3-S1 laminectomy  2. L4-5, L5-S1 posterolateral fusion  3. Use of local autograft  4. Use of fluroscopy  5. Use of operative microscope      ANESTHESIA  GETA    ESTIMATED BLOOD LOSS  100cc    DRAINS:  HV x1 deep to fascia     SPECIMENS/CULTURES   None.     COMPLICATIONS   None.    ANTIBIOTICS  Ancef     DVT PROPHYLAXIS  SCD'S and thigh stockings bilaterally    DISPOSITION   Stable to the PACU     IMPLANTS   None    INDICATION   Arvin Torres is a 63 year old male, well known to me. He presented with severe back and leg pain that had been progressively worsening. He reports that the pain has limited function, affecting all parts of his daily life. Imaging workup demonstrates L3-S1 severe central and lateral recess stenosis and L5-S1 foraminal stenosis as well as a degenerative spondylolisthesis at L4-5, severe facet arthropathy and DDD with vacuum disc changes at L5-S1. Conservative management has not helped address the problems. Risks and benefits of a laminectomy and fusion were explained to the patient and family/guardian in great detail. The risks of the procedure were discussed which include but are not limited to infection, bleeding, nerve injury, transient neuropraxia, neurologic deterioration, pseudarthrosis, adjacent segment degeneration, blood loss requiring transfusion, recurrence, reoperation for any reason, failure to improve, possible quadriceps weakness, possible injury to the dura, and the risk of anesthesia including MI, stroke, blood clot, and even death. After discussion of risks and benefits, the patient provided consent to  proceed.     DESCRIPTION OF PROCEDURE   On the day of surgery, the patient was seen in preoperative holding area and the appropriate operative site was identified and marked. The operation was reviewed with the patient who agreed to continue.    he was transferred to the operating room where general anesthesia was administered via endotracheal tube placement. Following a induction of general anesthesia, the patient was then positioned in the prone position on an open frame, a radiolucent table with all bony and down surfaces padded appropriately.     Next fluoroscopy was used to localize and staci the skin incision over the relevant vertebrae. At this point, the patient was prepped and draped in the usual sterile fashion. A WHO timeout was then performed, identifying the appropriate operative site, appropriate patient, appropriate surgical plan, instrumentation and antibiotics.    Exposure  At this point, using our previously marked out incision, a dissection was carried out through the skin and subcutaneous tissues from the level of the L3 spinous process to S1. The lumbodorsal fascia was then exposed.  With monopolar electrocautery, the lumbodorsal fascia was then subperiosteally dissected off of the posterior elements of the spine. This was performed sequentially on each side. This  exposure allowed visualization of the lamina from L3 all the way  down to S1. Care was taken to preserve the L3-4 facet joints. Adequate hemostasis was obtained throughout this process.     DECOMPRESSION  With excellent visualization of the posterior elements of the spine, and after fluoroscopic confirmation of the levels, a Posada retractor was placed and the microscope was brought in.  The decompression began with a Leksell rongeur to remove the spinous processes and thin out the posterior elements and lamina. The removed spinous processes and lamina were saved to be used as autograft later. First, a small angled curette was used  to separate the attachment of the ligamentum flavum under the ventral aspect of the lamina with good exposure of the lamina and ligamentum flavum interface. A Kerrison rongeur punch was then used to perform the laminectomy starting from S1 and progressing cephalad. This was performed widest at the level of the facet joint and slightly more narrow at the pars to preserve as much of the pars as possible. A janel was used to janel the bilateral L4-5 and L5-S1 facets to allow for adequate fusion.    With adequate  decompression in this fashion, the ligamentum flavum was then  excised en bloc, taking care to dissect it carefully off of the dural sac. There was noted to be extensive scarring and adhesions in more caudal aspect of the dura, but this was able to be manipulated off of the dura without any dural injury. The foramen at L5-S1 were noted to be tight and foraminotomies were performed bilaterally.     The [place] was used to track underneath the thecal sac proximally and distally and ensured adequate decompression with no remaining fragments compressing the overlying nerve root or the thecal sac. At this point, we were satisfied with the extent of the decompression, the neural elements were seen to be free.      The wound was thoroughly irrigated and hemostasis was ensured. A janel was used to janel the transverse processes of L4-L5 and the L4-5, and L5-S1 facets bilaterally. Local autograft was placed in the posterolateral gutters for fusion. A deep hemovac drain was placed over 1g vancomycin powder.    Closure  The wound was then closed in layers. #0 Vicryl was used to close the lumbodorsal fascia, 2-0 Vicryls for the subcutaneous layer, and the skin was closed with 3-0 monocryl.  Skin glue was applied. Dry sterile dressing was applied.    WAKE UP  The patient was placed supine on a hospital bed, awoken from anesthesia and the endotracheal tube was removed. The instrument and sponge count was correct at the end of  the case. The patient was moving all extremities equally to command at the end of the case and the patient was transferred to the recovery room in stable condition. I was present for and performed the entire case.

## 2024-01-26 NOTE — CONSULTS
[facility]    PATIENT'S NAME: JHOAN LR   ATTENDING PHYSICIAN: Catracho Murry MD   CONSULTING PHYSICIAN: Radha Lloyd MD   PATIENT ACCOUNT#:   [id number]    LOCATION:  1E Room 5 A [facility]  MEDICAL RECORD #:   [id number]       YOB: 1960  ADMISSION DATE:       01/26/2024      CONSULT DATE:  01/26/2024    REPORT OF CONSULTATION      REASON FOR ADMISSION:  Lumbar laminectomy.     HISTORY OF PRESENT ILLNESS:  The patient is a 63-year-old  male with lumbar spinal stenosis and chronic back pain with lumbar radiculopathy and neurogenic claudication, exhausted outpatient conservative medical management options, scheduled by his orthopedic surgeon, Dr. Catracho Murry, for the above-mentioned procedure, postoperatively brought into PACU for further monitoring.    PAST MEDICAL HISTORY:  Chronic obstructive pulmonary disease, anxiety, lumbar spinal stenosis with neurogenic claudication, transient ischemic attack, obstructive sleep apnea, and prostate cancer.    PAST SURGICAL HISTORY:  Back cyst resection, prostate biopsy.    MEDICATIONS:  Please see medication reconciliation list.    ALLERGIES:  No known drug allergies.    SOCIAL HISTORY:  Ex-tobacco user.  Social alcohol.  No drug use.  Lives with his family.  Independent in his basic activities of daily living.    FAMILY HISTORY:  Father had hypertension and prostate cancer.    REVIEW OF SYSTEMS:  The patient is currently resting in bed.  Some back discomfort, but no radiculopathy to lower extremities.  No chest pain.  No shortness of breath.  Other 12-point review of systems negative.      PHYSICAL EXAMINATION:    GENERAL:  Alert.  Oriented to time, place, and person.  No acute distress.  VITAL SIGNS:  Temperature 98.6, pulse 73, respiratory rate 13, blood pressure 122/79, pulse oximetry 98% on 3L nasal cannula oxygen.    HEENT:  Atraumatic.  Oropharynx clear.  Moist mucous membranes.  Ears and nose normal.  Eyes:  Anicteric sclerae.     NECK:  Supple.  No lymphadenopathy.  Trachea midline.  Full range of motion.    LUNGS:  Clear to auscultation bilaterally.  Normal respiratory effort.    HEART:  Regular rate, rhythm.  S1 and S2 auscultated.  No murmur.   ABDOMEN:  Soft, nondistended.  No tenderness.  Positive bowel sounds.    BACK:  Lumbar area with surgical dressing and Hemovac surgical drain.  NEUROLOGIC:  Motor and sensory intact.      ASSESSMENT AND PLAN:    1.   Lumbar spinal stenosis with neurogenic claudication.  Status post L3 to S1 laminectomy, L4 to S1 noninstrumented fusion.  Pain control.  Monitor surgical wound and drain.  Neuro checks.  DVT prophylaxis.  Physical and occupational therapy.  2.   Obstructive sleep apnea.  Apply obstructive sleep apnea protocol and monitor respiratory status.  3.   Chronic obstructive pulmonary disease.  Continue home medications and monitor.  4.   Essential hypertension.  Continue home medications and monitor.    Dictated By Radha Lloyd MD  d: 01/26/2024 12:04:20  t: 01/26/2024 12:39:37  Job [id number]  FB/    cc: Catracho Murry MD

## 2024-01-26 NOTE — ANESTHESIA PROCEDURE NOTES
Airway  Date/Time: 1/26/2024 7:38 AM  Urgency: Elective    Airway not difficult    General Information and Staff    Patient location during procedure: OR  Anesthesiologist: Thee Buenrostro MD  Resident/CRNA: Marce Viera CRNA  Performed: CRNA   Performed by: Marce Viera CRNA  Authorized by: Thee Buenrostro MD      Indications and Patient Condition  Indications for airway management: anesthesia  Sedation level: deep  Preoxygenated: yes  Patient position: sniffing  Mask difficulty assessment: 1 - vent by mask    Final Airway Details  Final airway type: endotracheal airway      Successful airway: ETT  Cuffed: yes   Successful intubation technique: direct laryngoscopy  Facilitating devices/methods: intubating stylet  Endotracheal tube insertion site: oral  Blade: Elmer  Blade size: #4  ETT size (mm): 7.5    Cormack-Lehane Classification: grade I - full view of glottis  Placement verified by: capnometry   Cuff volume (mL): 7  Measured from: teeth  ETT to teeth (cm): 23  Number of attempts at approach: 1    Additional Comments  Atraumatic. Gauze bite block

## 2024-01-27 VITALS
HEIGHT: 71 IN | DIASTOLIC BLOOD PRESSURE: 84 MMHG | WEIGHT: 200 LBS | RESPIRATION RATE: 20 BRPM | OXYGEN SATURATION: 96 % | HEART RATE: 95 BPM | BODY MASS INDEX: 28 KG/M2 | TEMPERATURE: 98 F | SYSTOLIC BLOOD PRESSURE: 130 MMHG

## 2024-01-27 LAB
HCT VFR BLD AUTO: 36 %
HGB BLD-MCNC: 11.3 G/DL

## 2024-01-27 PROCEDURE — 99214 OFFICE O/P EST MOD 30 MIN: CPT | Performed by: INTERNAL MEDICINE

## 2024-01-27 NOTE — OCCUPATIONAL THERAPY NOTE
OCCUPATIONAL THERAPY EVALUATION - INPATIENT     Room Number: Room 5/Room 5-A  Evaluation Date: 1/27/2024  Type of Evaluation: Initial  Presenting Problem: L3-S1 laminectomy, L4-S1 posteriorlateral fusion    Physician Order: IP Consult to Occupational Therapy  Reason for Therapy: ADL/IADL Dysfunction and Discharge Planning    OCCUPATIONAL THERAPY ASSESSMENT   Patient is a 63 year old male admitted 1/26/2024 for L3-S1 laminectomy, L4-S1 posteriorlateral fusion. Patient chart reviewed and therapist consulted with RN prior to seeing patient. Patient sitting in chair at beginning of session, but agreeable to participate in therapy on this date.  Prior to admission, patient was independent with all ADLs and IADLs. Patient lives in a house with wife. Patient was driving and currently does work. Patient used no assistive device for mobility.    Functional Mobility: Patient completes sit<>stand transfers with SPV and within room functional mobility with SBA. Patient is able to complete toilet transfer with SPV.    ADLs: Patient requires assist to don socks while sitting EOB. Education provided on use of sock aid. Patient demonstrates adequate standing balance to manage clothes in standing for dressing and toileting.     Observations: Patient is most limited this session throughout by pain management. Education provide on spine precautions and how those manifest functionally while completing ADLs and functional mobility. Education provided to patient on adaptive dressing techniques, as well as importance of OOB activity and energy conservation upon returning home. Patient verbalizes and demonstrates good understanding of all education.     It is recommended that patient return to bed with use of gait belt, walker and assist of 1 for safety. Alarm left on and call light within reach. Instructed to call nursing staff before getting up.    The patient's Approx Degree of Impairment: 32.79% has been calculated based on  documentation in the [facility] '6 clicks' Inpatient Daily Activity Short Form.  Research supports that patients with this level of impairment may benefit from home with 24 hour care.    DISCHARGE RECOMMENDATIONS  OT Discharge Recommendations: Home; 24 hour care/supervision  OT Device Recommendations: Reacher; Sock aid    PLAN  Patient has been evaluated and presents with no skilled Occupational Therapy needs  at this time.  Patient will be discharged from Occupational Therapy services. Please re-order if a new functional limitation presents during this admission.    OCCUPATIONAL THERAPY MEDICAL/SOCIAL HISTORY   Problem List  Principal Problem:    Lumbar spinal stenosis  Active Problems:    COPD (chronic obstructive pulmonary disease) ([facility])    Essential hypertension    Preop testing    HOME SITUATION  Type of Home: House  Home Layout: Able to live on main level  Lives With: Spouse  Toilet and Equipment: Toilet riser with arms  Shower/Tub and Equipment: Walk-in shower; Shower chair  Other Equipment: None  Occupation/Status:   Hand Dominance: Right  Drives: Yes    Stairs in Home: none  Use of Assistive Device(s): none    Prior Level of [place]: Prior to admission, patient was independent with all ADLs and IADLs. Patient lives in a house with wife. Patient was driving and currently does work. Patient used no assistive device for mobility.    SUBJECTIVE  \"I am doing pretty good.\"    OCCUPATIONAL THERAPY EXAMINATION    OBJECTIVE  Precautions: Spine  Fall Risk: Standard fall risk    PAIN ASSESSMENT  Ratin  Location: surgical spine  Management Techniques: Activity promotion; Body mechanics; Relaxation; Repositioning    COGNITION  Overall Cognitive Status:  WFL - within functional limits    VISION  Current Vision: no visual deficits    PERCEPTION  Overall Perception Status:   WFL - within functional limits    SENSATION  Light touch:  intact    Communication: Able to communicate wants and needs      Behavioral/Emotional/Social: Calm and cooperative     RANGE OF MOTION   Upper extremity ROM is within functional limits     STRENGTH ASSESSMENT  Upper extremity strength is within functional limits     COORDINATION  Gross Motor: WFL  Fine Motor: WFL     ACTIVITIES OF DAILY LIVING ASSESSMENT  AM-PAC ‘6-Clicks’ Inpatient Daily Activity Short Form  How much help from another person does the patient currently need…  -   Putting on and taking off regular lower body clothing?: A Little  -   Bathing (including washing, rinsing, drying)?: A Little  -   Toileting, which includes using toilet, bedpan or urinal? : A Little  -   Putting on and taking off regular upper body clothing?: None  -   Taking care of personal grooming such as brushing teeth?: None  -   Eating meals?: None    AM-PAC Score:  Score: 21  Approx Degree of Impairment: 32.79%  Standardized Score (AM-PAC Scale): 44.27  CMS Modifier (G-Code): CJ    FUNCTIONAL TRANSFER ASSESSMENT  Sit to Stand: Chair  Chair: Supervision  Toilet Transfer: Supervision    FUNCTIONAL ADL ASSESSMENT  Eating: Independent  Grooming Standing: Modified Independent  Bathing Seated: Stand-by Assist  UB Dressing Seated: Independent  LB Dressing Seated: Minimal Assist  Toileting Seated: Stand-by Assist    EDUCATION PROVIDED  Patient : Role of Occupational Therapy; Plan of Care; Discharge Recommendations; Adaptive Equipment Recommendations; DME Recommendations; Functional Transfer Techniques; Fall Prevention; Weight Bear Status; Surgical Precautions; Energy Conservation; Posture/Positioning; Proper Body Mechanics; Compensatory ADL Techniques  Patient's Response to Education: Verbalized Understanding; Returned Demonstration    Patient End of Session: Up in chair;Needs met;Call light within reach;RN aware of session/findings;All patient questions and concerns addressed;Family present    Patient was able to achieve the following ...   Patient able to toilet transfer  safely and independently     Patient able to dress lower extremities  safely and independently    Patient/Caregiver able to demonstrate safety with ADLS  safely and independently     Patient Evaluation Complexity Level:   Occupational Profile/Medical History LOW - Brief history including review of medical or therapy records    Specific performance deficits impacting engagement in ADL/IADL LOW  1 - 3 performance deficits    Client Assessment/Performance Deficits MODERATE - Comorbidities and min to mod modifications of tasks    Clinical Decision Making LOW - Analysis of occupational profile, problem-focused assessments, limited treatment options    Overall Complexity LOW     Self-Care Home Management: 15 minutes    Bernice Ponce OTR/L  [facility]  [id number]

## 2024-01-27 NOTE — PHYSICAL THERAPY NOTE
PHYSICAL THERAPY EVALUATION - INPATIENT     Room Number: Room 5/Room 5-A  Evaluation Date: 1/26/2024  Type of Evaluation: Initial   Physician Order: PT Eval and Treat    Presenting Problem: s/p L3-S1 laminectomy, L4-S1 post/lateral fusion 1/26/24     Reason for Therapy: Mobility Dysfunction and Discharge Planning    PHYSICAL THERAPY ASSESSMENT     Patient is a 63 year old male admitted 1/26/2024 for L3-S1 laminectomy, L4-S1 posterolateral fusion .  Patient's current functional deficits include impaired bed mobility, transfers, ambulation and stair negotiation, which are below the patient's pre-admission status.  Patient will benefit from continued inpatient physical therapy to address above issues so that patient may achieve highest functional mobility level.    Pt ok to see per rn.  Pt recd in bathroom, using rw, Rn had ambulated with pt into bathroom. Pt educated in role of PT, goals for session, spine precautions.  Gait training with rw with emphasis on safe rw use, upright posture. Pt ambulating with very slow marty, guarded, but with no LOB.  Pt limiting ambulation distance at this time due to surgical pain.  Pt returned to bedside chair.  Pt educated in log roll technique for bed mobility, expressed understanding. Pt also educated in activity recommendations, chair choice for home. Rn aware pt in chair.   Discussed POC, dc recommendation. Pt lives with supportive spouse, also reports he has sons that will be able to assist him as needed.     The patient's Approx Degree of Impairment: 46.58% has been calculated based on documentation in the [facility] '6 clicks' Inpatient Basic Mobility Short Form.  Research supports that patients with this level of impairment may benefit from home with supportive family.    DISCHARGE RECOMMENDATIONS  PT Discharge Recommendations: Home    PLAN  PT Treatment Plan: Bed mobility;Body mechanics;Endurance;Energy conservation;Patient education;Gait training;Transfer training  Rehab  Potential : Good  Frequency (Obs): Daily       PHYSICAL THERAPY MEDICAL/SOCIAL HISTORY        Problem List  Principal Problem:    Lumbar spinal stenosis  Active Problems:    COPD (chronic obstructive pulmonary disease) (HCC)    Essential hypertension    Preop testing      HOME SITUATION  Home Situation  Type of Home: House  Home Layout: Able to live on main level  Lives With: Spouse  Drives: Yes  Patient Owned Equipment: Rolling walker;Cane (not using pta)     Prior Level of [place]: Pt reports ind pta, did not use assistive device.  Pt lives with supportive spouse/sons who will be able to assist as needed upon edw dc.        SUBJECTIVE  \"This is what I get for working as a  and playing football\"    PHYSICAL THERAPY EXAMINATION     OBJECTIVE  Precautions: Spine  Fall Risk: Standard fall risk    WEIGHT BEARING RESTRICTION  Weight Bearing Restriction: None                PAIN ASSESSMENT  Rating: Unable to rate  Location: surgical  Management Techniques: Body mechanics;Activity promotion    COGNITION  Overall Cognitive Status:  WFL - within functional limits    RANGE OF MOTION AND STRENGTH ASSESSMENT  Upper extremity ROM and strength are within functional limits   Lower extremity ROM is within functional limits   Lower extremity strength is within functional limits     BALANCE  Static Sitting: Good  Dynamic Sitting: Good  Static Standing: Poor +  Dynamic Standing: Poor +       AM-PAC '6-Clicks' INPATIENT SHORT FORM - BASIC MOBILITY  How much difficulty does the patient currently have...  Patient Difficulty: Turning over in bed (including adjusting bedclothes, sheets and blankets)?: A Little   Patient Difficulty: Sitting down on and standing up from a chair with arms (e.g., wheelchair, bedside commode, etc.): A Little   Patient Difficulty: Moving from lying on back to sitting on the side of the bed?: A Little   How much help from another person does the patient currently need...   Help from Another: Moving  to and from a bed to a chair (including a wheelchair)?: A Little   Help from Another: Need to walk in hospital room?: A Little   Help from Another: Climbing 3-5 steps with a railing?: A Little     AM-PAC Score:  Raw Score: 18   Approx Degree of Impairment: 46.58%   Standardized Score (AM-PAC Scale): 43.63   CMS Modifier (G-Code): CK    FUNCTIONAL ABILITY STATUS  Functional Mobility/Gait Assessment  Gait Assistance: Minimum assistance  Distance (ft): 15  Assistive Device: Rolling walker  Pattern:  (guarded, slow pace due to pain)      Exercise/Education Provided:  Bed mobility  Body mechanics  Functional activity tolerated  Gait training  Posture  Transfer training    Patient End of Session: Up in chair;Call light within reach;Needs met;RN aware of session/findings;All patient questions and concerns addressed    CURRENT GOALS    Goals to be met by: 1/30/24  Patient Goal Patient's self-stated goal is: to have pain control   Goal #1 Patient is able to demonstrate supine - sit EOB @ level: supervision     Goal #1   Current Status    Goal #2 Patient is able to demonstrate transfers Sit to/from Stand at assistance level: supervision with walker - rolling     Goal #2  Current Status    Goal #3 Patient is able to ambulate 100 feet with assist device: walker - rolling at assistance level: supervision   Goal #3   Current Status    Goal #4 Patient will negotiate 4 stairs/one curb w/ assistive device and supervision   Goal #4   Current Status    Goal #5 Patient to demonstrate independence with home activity/exercise instructions provided to patient in preparation for discharge.   Goal #5   Current Status    Goal #6    Goal #6  Current Status      Patient Evaluation Complexity Level:  History Low - no personal factors and/or co-morbidities   Examination of body systems Low - addressing 1-2 elements   Clinical Presentation Low - Stable   Clinical Decision Making Low Complexity     Therapeutic Activity: 15 minutes

## 2024-01-27 NOTE — DISCHARGE SUMMARY
Discharge Summary     Arvin Torres Patient Status:  Outpatient in a Bed    1960 MRN [id number]   Location [facility] Attending Juany Linton MD   Hosp Day # 0 PCP SHRUTHI MCDONALD     Date of Admission: 2024    Date of Discharge: 2024  Discharge Disposition: Home or Self Care    Discharge Diagnosis:   Lumbar spinal stenosis with neurogenic claudication.   - Status post L3 to S1 laminectomy, L4 to S1 noninstrumented fusion.     History of Present Illness:             Brief Synopsis:     1.       Lumbar spinal stenosis with neurogenic claudication.   - Status post L3 to S1 laminectomy, L4 to S1 noninstrumented fusion.   Patient  had no perioperative complications and  was discharged in stable condition  on  home medications as well as appropriate analgesia and DVT prophylaxis.   Patient was evaluated by OT/PT services who cleared patient for dc home.        2.       Obstructive sleep apnea.        3.       Chronic obstructive pulmonary disease.    -Continue home medications and monitor.    4.       Essential hypertension.   - Continue home medications and monitor.          Lace+ Score: 44  59-90 High Risk  29-58 Medium Risk  0-28   Low Risk       TCM Follow-Up Recommendation:  LACE < 29: Low Risk of readmission after discharge from the hospital. No TCM follow-up needed.    Procedures during hospitalization:   - Status post L3 to S1 laminectomy, L4 to S1 noninstrumented fusion.         Consultants:  Orthopedic surgery    Discharge Medication List:     Discharge Medications        ASK your doctor about these medications        Instructions Prescription details   acetaminophen 325 MG Tabs  Commonly known as: Tylenol      Take 2 tablets (650 mg total) by mouth every 6 (six) hours as needed for Pain.   Refills: 0     amLODIPine 5 MG Tabs  Commonly known as: Norvasc      Take 1 tablet (5 mg total) by mouth daily.   Refills: 0     Budesonide-Formoterol Fumarate 160-4.5 MCG/ACT Aero  Commonly  known as: SYMBICORT      Inhale 2 puffs into the lungs 2 (two) times daily.   Refills: 0     losartan 25 MG Tabs  Commonly known as: Cozaar      Take 1 tablet (25 mg total) by mouth daily.   Refills: 0     prazosin 1 MG Caps  Commonly known as: Minipress      Take 1 capsule (1 mg total) by mouth daily.   Refills: 0     Spiriva Respimat 2.5 MCG/ACT Aers  Generic drug: Tiotropium Bromide Monohydrate      Inhale 1 puff into the lungs as needed.   Refills: 0              Follow-up appointment:   No follow-up provider specified.  Appointments for Next 30 Days 1/27/2024 - 2/26/2024      None            Supplementary Documentation:   [place]    Vital signs:  Temp:  [97.9 °F (36.6 °C)-98.4 °F (36.9 °C)] 97.9 °F (36.6 °C)  Pulse:  [65-95] 95  Resp:  [11-22] 20  BP: (117-145)/(78-93) 130/84  SpO2:  [95 %-100 %] 96 %    Physical Exam:    General:  NAD  Cardiovascular:  S1, S2    -----------------------------------------------------------------------------------------------  PATIENT DISCHARGE INSTRUCTIONS: See electronic chart    Tip: Documentation requirements: For split shared discharge, BOTH providers need to document specific floor, unit, and time spent on the discharge.  The note needs to be signed by the provider with > 50% of time and bill under their NPI.   Time spent:  35 min         Juany Linton MD

## 2024-01-27 NOTE — PHYSICAL THERAPY NOTE
PHYSICAL THERAPY TREATMENT NOTE - INPATIENT     Room Number: Room 5/Room 5-A       Presenting Problem: s/p L3-S1 laminectomy, L4-S1 post/lateral fusion 1/26/24    Problem List  Principal Problem:    Lumbar spinal stenosis  Active Problems:    COPD (chronic obstructive pulmonary disease) (HCC)    Essential hypertension    Preop testing    PHYSICAL THERAPY ASSESSMENT     Pt demonstrated all functional mobility at supervision to SBA level this date and is cleared for DC home from PT standpoint.     RN approved participation with physical therapy. Pt was received sitting in chair and agreeable to activity. Wife at bedside. Educated on spine precautions, log rolling, benefits of frequent ambulation and mobility, role of PT and goals for this session. Pt verbalized understanding. Pt with minimal surgical pain this date.   Transfers: supervision for STS with RW; initial VC for safe hand placement with good carryover noted.   Gait: 80 ft with RW and supervision; slow, guarded gait; VC for upright posture. No LOB or unsteady gait.   Stairs: 4 stairs with SBA, 1 HR; VC for sequencing of step-to pattern; no LOB.   Per discussion with pt and wife, no further questions or concerns about safe mobility and DC home today.   Pt was left sitting in chair with needs within reach, handoff to RN complete.    The patient's Approx Degree of Impairment: 46.58% has been calculated based on documentation in the [facility] '6 clicks' Inpatient Basic Mobility Short Form.  Research supports that patients with this level of impairment may benefit from home with HH PT however PT anticipates no needs.    DISCHARGE RECOMMENDATIONS  PT Discharge Recommendations: Home     PLAN  DC PT     SUBJECTIVE  Agreeable to activity.     OBJECTIVE  Precautions: Spine    WEIGHT BEARING RESTRICTION  none    PAIN ASSESSMENT   Rating:  (did not rate)  Location: lower back, surgical  Management Techniques: Activity promotion;Body mechanics;Repositioning    BALANCE  Static  Sitting: Good  Dynamic Sitting: Fair +  Static Standing: Fair  Dynamic Standing: Fair -    AM-PAC '6-Clicks' INPATIENT SHORT FORM - BASIC MOBILITY  How much difficulty does the patient currently have...  Patient Difficulty: Turning over in bed (including adjusting bedclothes, sheets and blankets)?: A Little   Patient Difficulty: Sitting down on and standing up from a chair with arms (e.g., wheelchair, bedside commode, etc.): A Little   Patient Difficulty: Moving from lying on back to sitting on the side of the bed?: A Little   How much help from another person does the patient currently need...   Help from Another: Moving to and from a bed to a chair (including a wheelchair)?: A Little   Help from Another: Need to walk in hospital room?: A Little   Help from Another: Climbing 3-5 steps with a railing?: A Little     AM-PAC Score:  Raw Score: 18   Approx Degree of Impairment: 46.58%   Standardized Score (AM-PAC Scale): 43.63   CMS Modifier (G-Code): CK    FUNCTIONAL ABILITY STATUS  Functional Mobility/Gait Assessment  Gait Assistance: Supervision  Distance (ft): 80  Assistive Device: Rolling walker  Pattern:  (slow pace, guarded, flexed posture)  Stairs: Stairs  How Many Stairs: 4  Device: 1 Rail  Assist: Other (SBA)  Pattern: Ascend and Descend  Ascend and Descend : Step to    Patient End of Session: Up in chair;Needs met;Call light within reach;RN aware of session/findings;All patient questions and concerns addressed;Family present    CURRENT GOALS   Goals to be met by: 1/30/24  Patient Goal Patient's self-stated goal is: to have pain control   Goal #1 Patient is able to demonstrate supine - sit EOB @ level: supervision      Goal #1   Current Status  NT; pt verbalized log rolling precautions    Goal #2 Patient is able to demonstrate transfers Sit to/from Stand at assistance level: supervision with walker - rolling      Goal #2  Current Status  goal met- supervision with RW    Goal #3 Patient is able to ambulate 100  feet with assist device: walker - rolling at assistance level: supervision   Goal #3   Current Status  80 ft with RW and supervision    Goal #4 Patient will negotiate 4 stairs/one curb w/ assistive device and supervision   Goal #4   Current Status 4 stairs with SBA    Goal #5 Patient to demonstrate independence with home activity/exercise instructions provided to patient in preparation for discharge.   Goal #5   Current Status Goal met - pt verbalized    Goal #6     Goal #6  Current Status        Gait Trainin minutes

## 2024-01-27 NOTE — DISCHARGE INSTRUCTIONS
Dressing off on day 3.  May shower.  Open to air, or daily gauze and tape dressings for comfort.  Take meds as directed. Pain meds are okay to take scheduled for first day or two, then wean off as able. Use incentive spirometer X10/hour.  Ankle pumps and ambulate every hour awake.  Vishnu hose during the day.

## (undated) DEVICE — SPK10329 JACKSON KIT: Brand: SPK10329 JACKSON KIT

## (undated) DEVICE — NON-ADHERENT DRESSING: Brand: TELFA

## (undated) DEVICE — ELECTRODE ES L16.5CM BLDE MPLR OPN APPRCH EZ

## (undated) DEVICE — SPONGE GZ 4XL4IN 100% COT 12 PLY TYP VII WVN

## (undated) DEVICE — PENCIL ES BTTN SWCH W/ TIP HOLSTER E-Z CLN

## (undated) DEVICE — C-ARMOR C-ARM EQUIPMENT COVERS CLEAR STERILE UNIVERSAL FIT 12 PER CASE: Brand: C-ARMOR

## (undated) DEVICE — SKIN PREP TRAY 4 COMPARTM TRAY: Brand: MEDLINE INDUSTRIES, INC.

## (undated) DEVICE — KIT HEMSTAT MTRX 8ML PORCINE GEL HUM THROM

## (undated) DEVICE — 3.0MM PRECISION NEURO (MATCH HEAD)

## (undated) DEVICE — LAMINECTOMY: Brand: MEDLINE INDUSTRIES, INC.

## (undated) DEVICE — INTENDED USE FOR SURGICAL MARKING ON INTACT SKIN, ALSO PROVIDES A PERMANENT METHOD OF IDENTIFYING OBJECTS IN THE OPERATING ROOM: Brand: WRITESITE® PLUS MINI PREP RESISTANT MARKER

## (undated) DEVICE — SUTURE VCRL SZ 2-0 L18IN ABSRB UD CT-1 L36MM

## (undated) DEVICE — SUTURE ETHLN SZ 3-0 L30IN NONABSORB BLK

## (undated) DEVICE — 3M™ TEGADERM™ TRANSPARENT FILM DRESSING, 1626W, 4 IN X 4-3/4 IN (10 CM X 12 CM), 50 EACH/CARTON, 4 CARTON/CASE: Brand: 3M™ TEGADERM™

## (undated) DEVICE — KIT EVAC 400CC DIA1/8IN H PAT 12.5IN 3 SPR

## (undated) DEVICE — WRAP COOLING BACK W/NO PILLOW

## (undated) DEVICE — GAMMEX® PI HYBRID SIZE 7.5, STERILE POWDER-FREE SURGICAL GLOVE, POLYISOPRENE AND NEOPRENE BLEND: Brand: GAMMEX

## (undated) DEVICE — TUBING MEGADYNE SPECULUM

## (undated) DEVICE — DRAPE SHEET LG

## (undated) DEVICE — ADHESIVE SKIN TOP FOR WND CLSR DERMBND ADV

## (undated) DEVICE — SUTURE MCRYL SZ 3-0 L18IN ABSRB UD L19MM PS-2

## (undated) DEVICE — MICRO KOVER: Brand: UNBRANDED

## (undated) DEVICE — SUTURE STRATAFIX SYMMTRC PDS + SZ 1 L18IN

## (undated) DEVICE — GAMMEX® PI HYBRID SIZE 7, STERILE POWDER-FREE SURGICAL GLOVE, POLYISOPRENE AND NEOPRENE BLEND: Brand: GAMMEX

## (undated) DEVICE — SUTURE VCRL SZ 0 L18IN ABSRB VLT L26MM CT-2

## (undated) NOTE — MR AVS SNAPSHOT
[address]  [address]  [place] [address]  [phone]               Thank you for choosing us for your health care visit with Everette Low MD.  We are glad to serve you and happy to provide you with this summary of Research has shown CBT to be a very effective treatment for anxiety disorders. How CBT is run is almost like a class. It involves homework and activities to build skills that teach you to cope with anxiety step by step.  It can be done in a group or one-on- · Exercise — it’s a great way to relieve tension and help your body feel relaxed. · Examine your life for stress, and try to find ways to reduce it. · Avoid caffeine and nicotine, which can make anxiety symptoms worse.   · Fight the temptation to turn to that make you want a cigarette. Then think of other ways to deal with these situations.  Here are some examples:  Situation How I'll handle it   Finishing a meal Get up from the table and take a walk   Having an argument Find a quiet place and breathe deepl You may be more likely to quit for good if you seek support from others. · Talk with your healthcare provider about your plans to quit. Ask about medicines that can help. Some contain nicotine and some do not.  Some are available by prescription. You can b [place] [id number] [address]   [phone]              Allergies as of Feb 13, 2017     No Known Allergies                Today's Vital Signs     BP Pulse Temp Weight          130/98 mmHg 86 98.2 °F (36.8 °C) (Oral) 188 lb Sign up for [employer], your secure online medical record. [employer] will allow you to access patient instructions from your recent visit,  view other health information, and more. To sign up or find more information, go to [email or web address] and cl